# Patient Record
Sex: FEMALE | Race: WHITE | NOT HISPANIC OR LATINO | Employment: FULL TIME | URBAN - METROPOLITAN AREA
[De-identification: names, ages, dates, MRNs, and addresses within clinical notes are randomized per-mention and may not be internally consistent; named-entity substitution may affect disease eponyms.]

---

## 2017-10-01 ENCOUNTER — VIRTUAL VISIT (OUTPATIENT)
Dept: FAMILY MEDICINE | Facility: OTHER | Age: 42
End: 2017-10-01

## 2017-10-01 NOTE — PROGRESS NOTES
"Date:   Clinician: Shasta Atwood  Clinician NPI: [id number]  Patient: Jessica De Paz  Patient : 1975  Patient Address: [address] [place] [address]  Patient Phone: [phone]  Visit Protocol: URI  Patient Summary:  Jessica is a 42 year old ( : 1975 ) female who initiated a Visit for cold, sinus infection, or influenza. When asked the question \"Please sign me up to receive news, health information and promotions. \", Jessica responded \"Yes\".    Jessica states her symptoms started 1-2 days ago.   Her symptoms consist of enlarged lymph nodes, malaise, a sore throat, a cough, and chills. Jessica also feels feverish.   Symptom Details     Cough: Jessica coughs every 5-10 minutes and her cough is more bothersome at night. Phlegm does not come into her throat when she coughs.     Sore throat: Jessica reports having severe throat pain, has exudate on her tonsils, and is able to swallow liquids. The lymph nodes in her neck are enlarged. She states that rashes have not appeared on the skin since the sore throat started.     Temperature: Her current temperature is 101 degrees Fahrenheit. Jessica has had a temperature over 100 degrees Fahrenheit for 1-2 days.      Jessica denies having nasal congestion, wheezing, myalgias, rhinitis, dyspnea, ear pain, facial pain or pressure, headache, and teeth pain. She also denies taking antibiotic medication for the symptoms and having recent facial or sinus surgery in the past 60 days.   Within the past week, Jessica has been exposed to someone with strep throat. She has not recently been exposed to someone with influenza. Jessica has not been in close contact with any high risk individuals.   Weight: 125 lbs   Jessica does not smoke or use smokeless tobacco.   She denies pregnancy and denies breastfeeding. She is currently menstruating.   Additional information as reported by the patient (free text): My daughter was diagnosed with strep on " Wednesday so I?m pretty certain I caught her strep throat. My throat is very red and it?s extremely painful to swallow even water.     MEDICATIONS:  No current medications , ALLERGIES:  NKDA   Clinician Response:  You have decided to not use the recommended ZipTicket for a rapid strep throat test. Based on the information you provided, your provider has determined that it is possible that you could have a strep infection. The strep test is the only way to determine if a bacterial infection or a virus is causing your sore throat. Since you have declined the ZipTicket, we will be unable to provide you with a diagnosis and treatment plan today.  In some cases, without proper diagnosis and treatment, this infection can become worse and make you sicker. You should be seen in a clinic if your symptoms worsen.   Diagnosis: ZipTicket Strep  Diagnosis ICD: J02.0  ZipTicket Results: [place] Strep Test - Declined  ZipTicket Secondary Results: null

## 2019-02-21 ENCOUNTER — HOSPITAL ENCOUNTER (OUTPATIENT)
Facility: CLINIC | Age: 44
End: 2019-02-21
Attending: PLASTIC SURGERY | Admitting: PLASTIC SURGERY

## 2019-11-04 ENCOUNTER — OFFICE VISIT (OUTPATIENT)
Dept: URGENT CARE | Facility: URGENT CARE | Age: 44
End: 2019-11-04
Payer: COMMERCIAL

## 2019-11-04 VITALS
OXYGEN SATURATION: 100 % | DIASTOLIC BLOOD PRESSURE: 85 MMHG | TEMPERATURE: 97 F | SYSTOLIC BLOOD PRESSURE: 125 MMHG | BODY MASS INDEX: 23.69 KG/M2 | WEIGHT: 138 LBS | HEART RATE: 80 BPM

## 2019-11-04 DIAGNOSIS — R30.0 DYSURIA: ICD-10-CM

## 2019-11-04 DIAGNOSIS — N30.01 ACUTE CYSTITIS WITH HEMATURIA: Primary | ICD-10-CM

## 2019-11-04 DIAGNOSIS — R10.32 ABDOMINAL PAIN, LEFT LOWER QUADRANT: ICD-10-CM

## 2019-11-04 LAB
RBC #/AREA URNS AUTO: NORMAL /HPF
WBC #/AREA URNS AUTO: NORMAL /HPF

## 2019-11-04 PROCEDURE — 99203 OFFICE O/P NEW LOW 30 MIN: CPT

## 2019-11-04 PROCEDURE — 81015 MICROSCOPIC EXAM OF URINE: CPT | Performed by: PHYSICIAN ASSISTANT

## 2019-11-04 RX ORDER — PHENAZOPYRIDINE HYDROCHLORIDE 100 MG/1
100 TABLET, FILM COATED ORAL 3 TIMES DAILY PRN
Qty: 10 TABLET | Refills: 0 | Status: SHIPPED | OUTPATIENT
Start: 2019-11-04

## 2019-11-04 RX ORDER — LEVOFLOXACIN 500 MG/1
500 TABLET, FILM COATED ORAL DAILY
Qty: 5 TABLET | Refills: 0 | Status: SHIPPED | OUTPATIENT
Start: 2019-11-04 | End: 2019-11-09

## 2019-11-04 ASSESSMENT — ENCOUNTER SYMPTOMS
RESPIRATORY NEGATIVE: 1
HEMATURIA: 1
HEADACHES: 0
DIFFICULTY URINATING: 1
FREQUENCY: 1
CARDIOVASCULAR NEGATIVE: 1
DYSURIA: 1
GASTROINTESTINAL NEGATIVE: 1
FATIGUE: 1

## 2019-11-05 NOTE — PATIENT INSTRUCTIONS
"  Patient Education     Bladder Infection, Female (Adult)    Urine is normally doesn't have any bacteria in it. But bacteria can get into the urinary tract from the skin around the rectum. Or they can travel in the blood from elsewhere in the body. Once they are in your urinary tract, they can cause infection in the urethra (urethritis), the bladder (cystitis), or the kidneys (pyelonephritis).  The most common place for an infection is in the bladder. This is called a bladder infection. This is one of the most common infections in women. Most bladder infections are easily treated. They are not serious unless the infection spreads to the kidney.  The phrases \"bladder infection,\" \"UTI,\" and \"cystitis\" are often used to describe the same thing. But they are not always the same. Cystitis is an inflammation of the bladder. The most common cause of cystitis is an infection.  Symptoms  The infection causes inflammation in the urethra and bladder. This causes many of the symptoms. The most common symptoms of a bladder infection are:    Pain or burning when urinating    Having to urinate more often than usual    Urgent need to urinate    Only a small amount of urine comes out    Blood in urine    Abdominal discomfort. This is usually in the lower abdomen above the pubic bone.    Cloudy urine    Strong- or bad-smelling urine    Unable to urinate (urinary retention)    Unable to hold urine in (urinary incontinence)    Fever    Loss of appetite    Confusion (in older adults)  Causes  Bladder infections are not contagious. You can't get one from someone else, from a toilet seat, or from sharing a bath.  The most common cause of bladder infections is bacteria from the bowels. The bacteria get onto the skin around the opening of the urethra. From there, they can get into the urine and travel up to the bladder, causing inflammation and infection. This usually happens because of:    Wiping improperly after urinating. Always wipe " from front to back.    Bowel incontinence    Pregnancy    Procedures such as having a catheter inserted    Older age    Not emptying your bladder. This can allow bacteria a chance to grow in your urine.    Dehydration    Constipation    Sex    Use of a diaphragm for birth control   Treatment  Bladder infections are diagnosed by a urine test. They are treated with antibiotics and usually clear up quickly without complications. Treatment helps prevent a more serious kidney infection.  Medicines  Medicines can help in the treatment of a bladder infection:    Take antibiotics until they are used up, even if you feel better. It is important to finish them to make sure the infection has cleared.    You can use acetaminophen or ibuprofen for pain, fever, or discomfort, unless another medicine was prescribed. If you have chronic liver or kidney disease, talk with your healthcare provider before using these medicines. Also talk with your provider if you've ever had a stomach ulcer or gastrointestinal bleeding, or are taking blood-thinner medicines.    If you are given phenazopydridine to reduce burning with urination, it will cause your urine to become a bright orange color. This can stain clothing.  Care and prevention  These self-care steps can help prevent future infections:    Drink plenty of fluids to prevent dehydration and flush out your bladder. Do this unless you must restrict fluids for other health reasons, or your doctor told you not to.    Proper cleaning after going to the bathroom is important. Wipe from front to back after using the toilet to prevent the spread of bacteria.    Urinate more often. Don't try to hold urine in for a long time.    Wear loose-fitting clothes and cotton underwear. Avoid tight-fitting pants.    Improve your diet and prevent constipation. Eat more fresh fruit and vegetables, and fiber, and less junk and fatty foods.    Avoid sex until your symptoms are gone.    Avoid caffeine,  alcohol, and spicy foods. These can irritate your bladder.    Urinate right after intercourse to flush out your bladder.    If you use birth control pills and have frequent bladder infections, discuss it with your doctor.  Follow-up care  Call your healthcare provider if all symptoms are not gone after 3 days of treatment. This is especially important if you have repeat infections.  If a culture was done, you will be told if your treatment needs to be changed. If directed, you can call to find out the results.  If X-rays were done, you will be told if the results will affect your treatment.  Call 911  Call 911 if any of the following occur:    Trouble breathing    Hard to wake up or confusion    Fainting or loss of consciousness    Rapid heart rate  When to seek medical advice  Call your healthcare provider right away if any of these occur:    Fever of 100.4 F (38.0 C) or higher, or as directed by your healthcare provider    Symptoms are not better by the third day of treatment    Back or belly (abdominal) pain that gets worse    Repeated vomiting, or unable to keep medicine down    Weakness or dizziness    Vaginal discharge    Pain, redness, or swelling in the outer vaginal area (labia)  Date Last Reviewed: 10/1/2016    4694-2403 [facility]. [address] [place] [address], [place] [address]. All rights reserved. This information is not intended as a substitute for professional medical care. Always follow your healthcare professional's instructions.

## 2019-11-05 NOTE — PROGRESS NOTES
SUBJECTIVE:   Jessica Bhagat is a 44 year old female presenting with a chief complaint of   Chief Complaint   Patient presents with     Urgent Care     UTI     Blood in urine. just happened a couple hrs ago.        She is an established patient of [facility].    UTI    Onset of symptoms was 1day(s).  Course of illness is worsening  Severity moderate  Current and associated symptoms dysuria, frequency, urgency, burning and blood in urine  Treatment and measures tried None  Predisposing factors include none  Patient denies rigors, flank pain, temperature > 101 degrees F., vomiting, taking Coumadin, GFR less than 30 within the last year, vaginal discharge, vaginal odor, vaginal itching and dyspareunia            Review of Systems   Constitutional: Positive for fatigue.   Respiratory: Negative.    Cardiovascular: Negative.    Gastrointestinal: Negative.    Genitourinary: Positive for difficulty urinating, dysuria, frequency and hematuria.   Neurological: Negative for headaches.   All other systems reviewed and are negative.      Past Medical History:   Diagnosis Date     Back pain, chronic      Breast disorder age 24    breast cancer     Fertility problem     clomid     No family history on file.  Current Outpatient Medications   Medication Sig Dispense Refill     NO ACTIVE MEDICATIONS        oxyCODONE (ROXICODONE) 5 MG immediate release tablet Take 1-2 tablets (5-10 mg) by mouth every 4 hours as needed for pain (Patient not taking: Reported on 11/4/2019) 20 tablet 0     Sertraline HCl (ZOLOFT PO) Take by mouth daily       Social History     Tobacco Use     Smoking status: Never Smoker   Substance Use Topics     Alcohol use: Not on file       OBJECTIVE  /85   Pulse 80   Temp 97  F (36.1  C) (Oral)   Wt 62.6 kg (138 lb)   SpO2 100%   BMI 23.69 kg/m      Physical Exam  Vitals signs and nursing note reviewed.   Constitutional:       Appearance: Normal appearance. She is normal weight.   Cardiovascular:       Rate and Rhythm: Normal rate and regular rhythm.      Pulses: Normal pulses.      Heart sounds: Normal heart sounds.   Pulmonary:      Effort: Pulmonary effort is normal.      Breath sounds: Normal breath sounds.   Abdominal:      General: Abdomen is flat.      Palpations: Abdomen is soft.      Tenderness: There is tenderness. There is no right CVA tenderness or left CVA tenderness.      Comments: Suprapubic tenderness with palpation   Skin:     General: Skin is warm and dry.   Neurological:      General: No focal deficit present.      Mental Status: She is alert and oriented to person, place, and time.         Labs:  Results for orders placed or performed in visit on 11/04/19 (from the past 24 hour(s))   Urine Microscopic   Result Value Ref Range    WBC Urine 0 - 5 OTO5^0 - 5 /HPF    RBC Urine O - 2 OTO2^O - 2 /HPF       X-Ray was not done.    ASSESSMENT:      ICD-10-CM    1. Blood in urine R31.9 Urine Microscopic     CANCELED: *UA reflex to Microscopic and Culture ([facility] (except [place] and [place])   2. Acute cystitis with hematuria N30.01         Medical Decision Making:    Differential Diagnosis:  UTI: UTI    Serious Comorbid Conditions:  Adult:  Malignancy    PLAN:    UTI Adult:  Pyridium 100 mg TID x 2 days  Levaquin x 5 days  Followup:    If not improving or if condition worsens, follow up with your Primary Care Provider    Patient Instructions     Patient Education     Bladder Infection, Female (Adult)    Urine is normally doesn't have any bacteria in it. But bacteria can get into the urinary tract from the skin around the rectum. Or they can travel in the blood from elsewhere in the body. Once they are in your urinary tract, they can cause infection in the urethra (urethritis), the bladder (cystitis), or the kidneys (pyelonephritis).  The most common place for an infection is in the bladder. This is called a bladder infection. This is one of the most common infections in women.  "Most bladder infections are easily treated. They are not serious unless the infection spreads to the kidney.  The phrases \"bladder infection,\" \"UTI,\" and \"cystitis\" are often used to describe the same thing. But they are not always the same. Cystitis is an inflammation of the bladder. The most common cause of cystitis is an infection.  Symptoms  The infection causes inflammation in the urethra and bladder. This causes many of the symptoms. The most common symptoms of a bladder infection are:    Pain or burning when urinating    Having to urinate more often than usual    Urgent need to urinate    Only a small amount of urine comes out    Blood in urine    Abdominal discomfort. This is usually in the lower abdomen above the pubic bone.    Cloudy urine    Strong- or bad-smelling urine    Unable to urinate (urinary retention)    Unable to hold urine in (urinary incontinence)    Fever    Loss of appetite    Confusion (in older adults)  Causes  Bladder infections are not contagious. You can't get one from someone else, from a toilet seat, or from sharing a bath.  The most common cause of bladder infections is bacteria from the bowels. The bacteria get onto the skin around the opening of the urethra. From there, they can get into the urine and travel up to the bladder, causing inflammation and infection. This usually happens because of:    Wiping improperly after urinating. Always wipe from front to back.    Bowel incontinence    Pregnancy    Procedures such as having a catheter inserted    Older age    Not emptying your bladder. This can allow bacteria a chance to grow in your urine.    Dehydration    Constipation    Sex    Use of a diaphragm for birth control   Treatment  Bladder infections are diagnosed by a urine test. They are treated with antibiotics and usually clear up quickly without complications. Treatment helps prevent a more serious kidney infection.  Medicines  Medicines can help in the treatment of a bladder " infection:    Take antibiotics until they are used up, even if you feel better. It is important to finish them to make sure the infection has cleared.    You can use acetaminophen or ibuprofen for pain, fever, or discomfort, unless another medicine was prescribed. If you have chronic liver or kidney disease, talk with your healthcare provider before using these medicines. Also talk with your provider if you've ever had a stomach ulcer or gastrointestinal bleeding, or are taking blood-thinner medicines.    If you are given phenazopydridine to reduce burning with urination, it will cause your urine to become a bright orange color. This can stain clothing.  Care and prevention  These self-care steps can help prevent future infections:    Drink plenty of fluids to prevent dehydration and flush out your bladder. Do this unless you must restrict fluids for other health reasons, or your doctor told you not to.    Proper cleaning after going to the bathroom is important. Wipe from front to back after using the toilet to prevent the spread of bacteria.    Urinate more often. Don't try to hold urine in for a long time.    Wear loose-fitting clothes and cotton underwear. Avoid tight-fitting pants.    Improve your diet and prevent constipation. Eat more fresh fruit and vegetables, and fiber, and less junk and fatty foods.    Avoid sex until your symptoms are gone.    Avoid caffeine, alcohol, and spicy foods. These can irritate your bladder.    Urinate right after intercourse to flush out your bladder.    If you use birth control pills and have frequent bladder infections, discuss it with your doctor.  Follow-up care  Call your healthcare provider if all symptoms are not gone after 3 days of treatment. This is especially important if you have repeat infections.  If a culture was done, you will be told if your treatment needs to be changed. If directed, you can call to find out the results.  If X-rays were done, you will be told  if the results will affect your treatment.  Call 911  Call 911 if any of the following occur:    Trouble breathing    Hard to wake up or confusion    Fainting or loss of consciousness    Rapid heart rate  When to seek medical advice  Call your healthcare provider right away if any of these occur:    Fever of 100.4 F (38.0 C) or higher, or as directed by your healthcare provider    Symptoms are not better by the third day of treatment    Back or belly (abdominal) pain that gets worse    Repeated vomiting, or unable to keep medicine down    Weakness or dizziness    Vaginal discharge    Pain, redness, or swelling in the outer vaginal area (labia)  Date Last Reviewed: 10/1/2016    6401-2212 [employer] [facility]. [address] [place] [address], [place] [address]. All rights reserved. This information is not intended as a substitute for professional medical care. Always follow your healthcare professional's instructions.

## 2020-05-02 ENCOUNTER — HOSPITAL ENCOUNTER (EMERGENCY)
Facility: CLINIC | Age: 45
Discharge: HOME | End: 2020-05-02
Attending: PHYSICIAN ASSISTANT | Admitting: PHYSICIAN ASSISTANT
Payer: COMMERCIAL

## 2020-05-02 VITALS
DIASTOLIC BLOOD PRESSURE: 95 MMHG | WEIGHT: 125 LBS | RESPIRATION RATE: 16 BRPM | HEART RATE: 59 BPM | OXYGEN SATURATION: 100 % | TEMPERATURE: 98 F | SYSTOLIC BLOOD PRESSURE: 148 MMHG | BODY MASS INDEX: 21.34 KG/M2 | HEIGHT: 64 IN

## 2020-05-02 DIAGNOSIS — K59.00 CONSTIPATION: ICD-10-CM

## 2020-05-02 PROCEDURE — 96372 THER/PROPH/DIAG INJ SC/IM: CPT

## 2020-05-02 PROCEDURE — 25000128 H RX IP 250 OP 636: Performed by: PHYSICIAN ASSISTANT

## 2020-05-02 PROCEDURE — 99285 EMERGENCY DEPT VISIT HI MDM: CPT | Mod: 25

## 2020-05-02 PROCEDURE — 25000132 ZZH RX MED GY IP 250 OP 250 PS 637: Performed by: PHYSICIAN ASSISTANT

## 2020-05-02 RX ORDER — HYDROMORPHONE HYDROCHLORIDE 1 MG/ML
0.5 INJECTION, SOLUTION INTRAMUSCULAR; INTRAVENOUS; SUBCUTANEOUS ONCE
Status: COMPLETED | OUTPATIENT
Start: 2020-05-02 | End: 2020-05-02

## 2020-05-02 RX ORDER — LORAZEPAM 2 MG/ML
0.5 INJECTION INTRAMUSCULAR ONCE
Status: COMPLETED | OUTPATIENT
Start: 2020-05-02 | End: 2020-05-02

## 2020-05-02 RX ADMIN — MAGNESIUM CITRATE 286 ML: 1.75 LIQUID ORAL at 12:45

## 2020-05-02 RX ADMIN — LORAZEPAM 0.5 MG: 2 INJECTION INTRAMUSCULAR; INTRAVENOUS at 11:58

## 2020-05-02 RX ADMIN — HYDROMORPHONE HYDROCHLORIDE 0.5 MG: 1 INJECTION, SOLUTION INTRAMUSCULAR; INTRAVENOUS; SUBCUTANEOUS at 12:36

## 2020-05-02 ASSESSMENT — ENCOUNTER SYMPTOMS
ABDOMINAL PAIN: 1
DIAPHORESIS: 1
CONSTIPATION: 1
DYSURIA: 0
RECTAL PAIN: 1
FEVER: 0
VOMITING: 0
HEMATURIA: 0

## 2020-05-02 ASSESSMENT — MIFFLIN-ST. JEOR: SCORE: 1197

## 2020-05-02 NOTE — ED PROVIDER NOTES
"  History     Chief Complaint:  Constipation      The history is provided by the patient.      Jessica Bhagat is a 44 year old female with a history of breast cancer and hemorrhoids who presents for evaluation of constipation. The patient woke up today with abdominal cramps which she thought could be due to constipation because she usually has a bowel movement in the morning. Her last bowel movement was yesterday morning; it was normal. She had two enemas one hour ago which did not improve the pain. She reports increased abdominal pain when sitting and rectal pain when attempting a bowel movement.  She denies vomiting, fever, urinary symptoms, and history of abdominal surgery. She also denies recent medication change or changes to her diet. She reports a history of constipation after her mastectomy and a history of hemorrhoids. Patient notes the pain today is similar to prior episode of constipation.  No chest pain or shortness of breath.    Allergies:  No Known Allergies     Medications:    Albuterol inhaler  Phenazopyridine   Sertraline    Past Medical History:    Back pain, chronic  Breast cancer  Fertility problem  Hemorrhoids     Past Surgical History:    Enlarge breast with implant  Lumpectomy breast    Family History:    Ovarian cancer    Social History:  Marital Status:   [2]  Negative for tobacco use.  Negative for alcohol use.  Negative for drug use.     Review of Systems   Constitutional: Positive for diaphoresis. Negative for fever.   Gastrointestinal: Positive for abdominal pain, constipation and rectal pain. Negative for vomiting.   Genitourinary: Negative for dysuria and hematuria.   All other systems reviewed and are negative.    Physical Exam     Patient Vitals for the past 24 hrs:   BP Temp Temp src Pulse Resp SpO2 Height Weight   05/02/20 1132 (!) 148/95 98  F (36.7  C) Oral 59 16 100 % 1.626 m (5' 4\") 56.7 kg (125 lb)       Physical Exam  General: Alert and cooperative with exam. " Resting comfortably on gurney  Head:  Scalp is NC/AT  Eyes:  No scleral icterus, PERRL  ENT:  The external nose and ears are normal.   Neck:  Normal range of motion without rigidity.  CV:  Regular rate and rhythm    No pathologic murmur, rubs, or gallops.  Resp:  Breath sounds are clear bilaterally.  No crackles, wheezes, rhonchi, stridor.    Non-labored, no retractions or accessory muscle use  GI:  Abdomen is soft, no distension, stool fullness in lower abdomen BL with mild tenderness, no masses. No peritoneal signs.  Bowel sounds present in all quadrants.  Rectal exam demonstrates no distal impaction, No rectal fluctuance.  Internal hemorrhoids.  No external hemorrhoids or fissures (Performed in presence of female chaperone [place] EDT)  :  No suprapubic or flank tenderness  MS:  No lower extremity edema or asymmetric calf swelling.  Skin:  Warm and dry, No rash or lesions noted.  Neuro: Oriented. No gross motor deficits.  Psych: Awake. Alert. Normal affect. Appropriate interactions.     Emergency Department Course     Procedures:  None    Interventions:  1158 Ativan, 0.5 mg, IM  1236 Dilaudid, 0.5 mg ,IV  1245 Pink Lady Enema (docusate, magnesium citrate, mineral oil, sodium phosphate), 286 ml, Rectal    Emergency Department Course:  Past medical records, nursing notes, and vitals reviewed.    1140 I performed an exam of the patient as documented above.     1223 I prepped the patient for a pink lady enema on the patient with the assistance of a female nurse.  1324 I rechecked the patient and discussed the results of her workup thus far. She is feeling much better and desires discharge to home.    Findings and plan explained to the Patient. Patient discharged home with instructions regarding supportive care, medications, and reasons to return. The importance of close follow-up was reviewed.     I personally answered all related questions prior to discharge.     Impression & Plan     Medical Decision  [place]akin-year-old female presents with constipation and abdominal pain.  History and records reviewed.  Broad differential was considered.  She has lower abdominal fullness with mild tenderness.   We discussed options, and the patient would like to try pink lady enema prior to any further evaluation to see if this improves her symptoms.  I did perform a rectal exam which demonstrated no evidence of rectal impaction, but did demonstrate some internal hemorrhoids.  She felt markedly improved following enema and was able to have bowel movement and is now comfortable and desires discharged home.  Repeat abdominal exam is benign and nontender.  My suspicion for intra-abdominal catastrophe such as appendicitis, diverticulitis, abscess, bowel obstruction, ischemic bowel, etc. is very low at this time.  We discussed symptomatic care at home including bowel regimen, stool softeners and sitz baths, and she has seen colon and rectal for hemorrhoids before and will reach out to them if symptoms not improving.  She will return to the ED if she develops return of abdominal pain, vomiting, fevers, or any other new or worsening symptoms.      Diagnosis:    ICD-10-CM    1. Constipation  K59.00        Disposition:  Discharged to home.    Scribe Disclosure:  Antonella POOLE, am serving as a scribe at 11:40 AM on 2020 to document services personally performed by Clark Baldwin,  based on my observations and the provider's statements to me.      EMERGENCY DEPARTMENT       Clark Baldwin PA-C  [phone]

## 2020-05-02 NOTE — ED AVS SNAPSHOT
Emergency Department  [address] [facility] [address]  Phone:  [phone]  Fax:  [phone]                                    Jessica Bhagat   MRN: [id number]    Department:   Emergency Department   Date of Visit:  5/2/2020           After Visit Summary Signature Page    I have received my discharge instructions, and my questions have been answered. I have discussed any challenges I see with this plan with the nurse or doctor.    ..........................................................................................................................................  Patient/Patient Representative Signature      ..........................................................................................................................................  Patient Representative Print Name and Relationship to Patient    ..................................................               ................................................  Date                                   Time    ..........................................................................................................................................  Reviewed by Signature/Title    ...................................................              ..............................................  Date                                               Time          22EPIC Rev 08/18

## 2021-03-11 ENCOUNTER — AMBULATORY - HEALTHEAST (OUTPATIENT)
Dept: NURSING | Facility: CLINIC | Age: 46
End: 2021-03-11

## 2021-04-01 ENCOUNTER — NURSE TRIAGE (OUTPATIENT)
Dept: NURSING | Facility: CLINIC | Age: 46
End: 2021-04-01

## 2021-04-01 NOTE — TELEPHONE ENCOUNTER
Jessica is calling and states that her  tested positive for covid and Jessica is due for her second vaccine today.  FNA advised to quarantine or to get tested and if negative can still get second vaccine and patient agreed.  Patient will go get a covid test today.    COVID 19 Nurse Triage Plan/Patient Instructions    Please be aware that novel coronavirus (COVID-19) may be circulating in the community. If you develop symptoms such as fever, cough, or SOB or if you have concerns about the presence of another infection including coronavirus (COVID-19), please contact your health care provider or visit [email or web address].     Disposition/Instructions    Virtual Visit with provider recommended. Reference Visit Selection Guide.    Thank you for taking steps to prevent the spread of this virus.  o Limit your contact with others.  o Wear a simple mask to cover your cough.  o Wash your hands well and often.    Resources    [facility]: About COVID-19: [email or web address]    CDC: What to Do If You're Sick: www.cdc.gov/coronavirus/2019-ncov/about/steps-when-sick.html    CDC: Ending Home Isolation: www.cdc.gov/coronavirus/2019-ncov/hcp/disposition-in-home-patients.html     CDC: Caring for Someone: www.cdc.gov/coronavirus/2019-ncov/if-you-are-sick/care-for-someone.html     [facility]: Interim Guidance for Hospital Discharge to Home: www.health.[facility].mn.us/diseases/coronavirus/hcp/hospdischarge.pdf    [facility] clinical trials (COVID-19 research studies): [email or web address].[email or web address]-clinical-trials     Below are the COVID-19 hotlines at the [facility] ([facility]). Interpreters are available.   o For health questions: Call 254-465-7854 or 1-713.873.2153 (7 a.m. to 7 p.m.)  o For questions about schools and childcare: Call 485-173-1329 or 1-501.752.5196 (7 a.m. to 7 p.m.)                     Reason for Disposition    [1] CLOSE CONTACT COVID-19 EXPOSURE within last 14 days AND  [2] NO symptoms    Additional Information    Negative: [1] CLOSE CONTACT COVID-19 EXPOSURE within last 14 days AND [2] needs COVID-19 lab test to return to work AND [3] NO symptoms    Negative: [1] CLOSE CONTACT COVID-19 EXPOSURE within last 14 days AND [2] exposed person is a  (e.g., police or paramedic) AND [3] NO symptoms    Negative: [1] CLOSE CONTACT COVID-19 EXPOSURE within last 14 days AND [2] exposed person is a healthcare worker who was NOT using all recommended personal protective equipment (i.e., a respirator-N95 mask, eye protection, gloves, and gown) AND [3] NO symptoms    Negative: [1] Living or working in a correctional facility, long-term care facility, or shelter (i.e., congregate setting; densely populated) AND [2] where an outbreak has occurred AND [3] NO symptoms    Protocols used: CORONAVIRUS (COVID-19) EXPOSURE-A-AH 1.3

## 2021-04-06 ENCOUNTER — AMBULATORY - HEALTHEAST (OUTPATIENT)
Dept: NURSING | Facility: CLINIC | Age: 46
End: 2021-04-06

## 2022-05-03 ENCOUNTER — HOSPITAL ENCOUNTER (EMERGENCY)
Facility: CLINIC | Age: 47
Discharge: HOME | End: 2022-05-03
Attending: EMERGENCY MEDICINE | Admitting: EMERGENCY MEDICINE
Payer: COMMERCIAL

## 2022-05-03 ENCOUNTER — APPOINTMENT (OUTPATIENT)
Dept: ULTRASOUND IMAGING | Facility: CLINIC | Age: 47
End: 2022-05-03
Attending: EMERGENCY MEDICINE
Payer: COMMERCIAL

## 2022-05-03 VITALS
OXYGEN SATURATION: 97 % | RESPIRATION RATE: 18 BRPM | DIASTOLIC BLOOD PRESSURE: 84 MMHG | HEART RATE: 59 BPM | BODY MASS INDEX: 22.31 KG/M2 | TEMPERATURE: 98.7 F | SYSTOLIC BLOOD PRESSURE: 122 MMHG | WEIGHT: 130 LBS

## 2022-05-03 DIAGNOSIS — I82.890 SUPERFICIAL VEIN THROMBOSIS: ICD-10-CM

## 2022-05-03 PROCEDURE — 93971 EXTREMITY STUDY: CPT | Mod: RT

## 2022-05-03 PROCEDURE — 250N000013 HC RX MED GY IP 250 OP 250 PS 637: Performed by: EMERGENCY MEDICINE

## 2022-05-03 PROCEDURE — 99284 EMERGENCY DEPT VISIT MOD MDM: CPT | Mod: 25

## 2022-05-03 RX ORDER — IBUPROFEN 600 MG/1
600 TABLET, FILM COATED ORAL ONCE
Status: COMPLETED | OUTPATIENT
Start: 2022-05-03 | End: 2022-05-03

## 2022-05-03 RX ORDER — ACETAMINOPHEN 325 MG/1
975 TABLET ORAL ONCE
Status: COMPLETED | OUTPATIENT
Start: 2022-05-03 | End: 2022-05-03

## 2022-05-03 RX ADMIN — ACETAMINOPHEN 975 MG: 325 TABLET ORAL at 21:30

## 2022-05-03 RX ADMIN — IBUPROFEN 600 MG: 600 TABLET ORAL at 21:31

## 2022-05-03 RX ADMIN — RIVAROXABAN 10 MG: 10 TABLET, FILM COATED ORAL at 21:53

## 2022-05-04 NOTE — ED NOTES
Had breast surgery reconstruction and revision on Wednesday, bilateral MARQUEZ drains, no issues with healing from the surgery, but the patient noticed yesterday a bump had formed around the IV site on the forearm of the right arm. Site has mild localized swelling along with redness and associated tenderness.

## 2022-05-04 NOTE — ED TRIAGE NOTES
Triage Assessment     Row Name 05/03/22 2006       Triage Assessment (Adult)    Airway WDL WDL       Respiratory WDL    Respiratory WDL WDL       Skin Circulation/Temperature WDL    Skin Circulation/Temperature WDL WDL       Cardiac WDL    Cardiac WDL WDL       Peripheral/Neurovascular WDL    Peripheral Neurovascular WDL WDL       Cognitive/Neuro/Behavioral WDL    Cognitive/Neuro/Behavioral WDL WDL

## 2022-05-04 NOTE — ED PROVIDER NOTES
History     Chief Complaint:    Arm Pain (Pt had surgery, states R arm has tenderness from previous IV site to elbow. Reports swelling and feeling warm)       HPI   Jessica Bhagat is a 46 year old female who presents with concern of right upper extremity pain.  The patient underwent revisional/reconstructive breast surgery approximately 1 week ago.  The surgery went well.  Yesterday she noted pain to the right upper limb with a linear red area.  She contacted her surgeon and was advised to be assessed for blood clot.  She states she is never had any history of thrombus.  She tried ibuprofen with some relief of the discomfort.  She denies any new chest pain.  The chest pain she has she considers postoperative and slowly improving.  No dyspnea.  She did feel slightly lightheaded although she attributes that to her medication.  No palpitations.  No hemoptysis.  No fever.  Does not use anticoagulants.  No other complaints.    Allergies:  No Known Allergies     Medications:    [START ON 5/4/2022] rivaroxaban ANTICOAGULANT (XARELTO ANTICOAGULANT) 10 MG TABS tablet  NO ACTIVE MEDICATIONS  oxyCODONE (ROXICODONE) 5 MG immediate release tablet  phenazopyridine (PYRIDIUM) 100 MG tablet  Sertraline HCl (ZOLOFT PO)      Past Medical History:    Past Medical History:   Diagnosis Date     Back pain, chronic      Breast disorder age 24     Fertility problem        Patient Active Problem List    Diagnosis Date Noted     Normal vaginal delivery 04/25/2012     Priority: Medium     Obstetrical laceration, second degree 04/25/2012     Priority: Medium      Past Surgical History:    Past Surgical History:   Procedure Laterality Date     HC ENLARGE BREAST WITH IMPLANT       LUMPECTOMY BREAST  age 24    cancer      Family History:    family history is not on file.    Social History:   reports that she has never smoked. She does not have any smokeless tobacco history on file.    PCP: Berry Young     Review of Systems  All  systems otherwise negative or per HPI    Physical Exam     Patient Vitals for the past 24 hrs:   BP Temp Temp src Pulse Resp SpO2 Weight   05/03/22 2200 122/84 -- -- 59 -- -- --   05/03/22 2005 117/88 98.7  F (37.1  C) Oral 58 18 97 % 59 kg (130 lb)      Physical Exam  SKIN:  Warm, dry.  HEMATOLOGIC/IMMUNOLOGIC/LYMPHATIC:  No pallor.  No edema of right upper limb.  There is linear erythema of the right upper limb.  HENT: No JVD.  EYES:  Conjunctivae normal.  CARDIOVASCULAR:  Regular rate and rhythm.  No murmur.  Palpable vascular cord of the right upper limb associated with the linear erythema.  RESPIRATORY:  No respiratory distress, breath sounds equal and normal.  GASTROINTESTINAL:  Soft, nontender abdomen.  MUSCULOSKELETAL: Full active range of motion of the upper limb.  Right upper limb is tender along the linear inflammation.  NEUROLOGIC:  Alert, conversant.  PSYCHIATRIC:  Normal mood.    Emergency Department Course     Imaging:    US Upper Extremity Venous Duplex Right   Final Result   IMPRESSION:   1.  No deep venous thrombosis in the right upper extremity.   2.  Occlusive superficial thrombus in the cephalic vein from the level of the mid humerus to the mid forearm.            Laboratory:    Labs Ordered and Resulted from Time of ED Arrival to Time of ED Departure - No data to display     Procedures:  None    Interventions:    Medications   acetaminophen (TYLENOL) tablet 975 mg (975 mg Oral Given 5/3/22 2130)   ibuprofen (ADVIL/MOTRIN) tablet 600 mg (600 mg Oral Given 5/3/22 2131)   rivaroxaban ANTICOAGULANT (XARELTO) tablet 10 mg (10 mg Oral Given 5/3/22 2153)        Emergency Department Course:  Past medical records, nursing notes, and vitals reviewed.  I performed an exam of the patient and obtained history, as documented above.  I rechecked the patient. Findings and plan explained to the patient. Patient was discharged.    Impression & Plan      Medical Decision Making:  This patient presents with  concern of thrombus to the right upper limb.  Ultrasound did reveal cephalic vein thrombus.  This is a superficial vein but after I consulted the clinical pharmacist in the emergency department she recommended low-dose Xarelto at 10 mg a day for 45 days particularly as the patient is post-op.  She is agreeable with this plan.  I do not have high suspicion for additional thrombus such as pulmonary embolism given she does not have any new chest pain, dyspnea or other symptoms suggestive of thrombus.  She states she briefly felt lightheaded but attributed that to her postoperative medications.  Would be extremely unlikely this superficial thrombus would so quickly migrate to the deep venous system.  I encouraged the patient to return if feeling worse or if any new concerns particularly cardiopulmonary.    Diagnosis:    ICD-10-CM    1. Superficial vein thrombosis  I82.890         Discharge Medications:  Discharge Medication List as of 5/3/2022  9:55 PM      START taking these medications    Details   rivaroxaban ANTICOAGULANT (XARELTO ANTICOAGULANT) 10 MG TABS tablet Take 1 tablet (10 mg) by mouth daily (with dinner), Disp-44 tablet, R-0, E-Prescribe              5/3/2022   Damon Coyne MD Moe, James Thomas, MD  05/03/22 6006